# Patient Record
Sex: FEMALE | Race: WHITE | Employment: STUDENT | URBAN - METROPOLITAN AREA
[De-identification: names, ages, dates, MRNs, and addresses within clinical notes are randomized per-mention and may not be internally consistent; named-entity substitution may affect disease eponyms.]

---
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
## 2021-09-30 ENCOUNTER — TELEMEDICINE (OUTPATIENT)
Dept: PRIMARY CARE CLINIC | Age: 21
End: 2021-09-30

## 2021-09-30 DIAGNOSIS — J01.40 ACUTE NON-RECURRENT PANSINUSITIS: Primary | ICD-10-CM

## 2021-09-30 PROBLEM — Z87.81 HISTORY OF PELVIC FRACTURE: Status: ACTIVE | Noted: 2017-10-20

## 2021-09-30 PROBLEM — J30.89 PERENNIAL ALLERGIC RHINITIS WITH SEASONAL VARIATION: Status: RESOLVED | Noted: 2021-09-30 | Resolved: 2021-09-30

## 2021-09-30 PROBLEM — J30.2 PERENNIAL ALLERGIC RHINITIS WITH SEASONAL VARIATION: Status: RESOLVED | Noted: 2021-09-30 | Resolved: 2021-09-30

## 2021-09-30 PROBLEM — J30.2 PERENNIAL ALLERGIC RHINITIS WITH SEASONAL VARIATION: Status: ACTIVE | Noted: 2021-09-30

## 2021-09-30 PROBLEM — B35.1 ONYCHOMYCOSIS: Status: RESOLVED | Noted: 2018-10-10 | Resolved: 2021-09-30

## 2021-09-30 PROBLEM — J30.89 PERENNIAL ALLERGIC RHINITIS WITH SEASONAL VARIATION: Status: ACTIVE | Noted: 2021-09-30

## 2021-09-30 PROBLEM — R55 VASOVAGAL RESPONSE: Status: RESOLVED | Noted: 2017-10-20 | Resolved: 2021-09-30

## 2021-09-30 PROBLEM — R55 VASOVAGAL RESPONSE: Status: ACTIVE | Noted: 2017-10-20

## 2021-09-30 PROBLEM — Z87.81 HISTORY OF PELVIC FRACTURE: Status: RESOLVED | Noted: 2017-10-20 | Resolved: 2021-09-30

## 2021-09-30 PROBLEM — F43.20 ADJUSTMENT REACTION: Status: RESOLVED | Noted: 2020-03-04 | Resolved: 2021-09-30

## 2021-09-30 PROBLEM — F43.20 ADJUSTMENT REACTION: Status: ACTIVE | Noted: 2020-03-04

## 2021-09-30 PROCEDURE — 99203 OFFICE O/P NEW LOW 30 MIN: CPT

## 2021-09-30 RX ORDER — AMOXICILLIN AND CLAVULANATE POTASSIUM 875; 125 MG/1; MG/1
1 TABLET, FILM COATED ORAL 2 TIMES DAILY
COMMUNITY
Start: 2021-03-18

## 2021-09-30 RX ORDER — AMOXICILLIN AND CLAVULANATE POTASSIUM 500; 125 MG/1; MG/1
1 TABLET, FILM COATED ORAL 3 TIMES DAILY
Qty: 21 TABLET | Refills: 0 | Status: SHIPPED | OUTPATIENT
Start: 2021-09-30 | End: 2021-10-07

## 2021-09-30 RX ORDER — LEVONORGESTREL 19.5 MG/1
19.5 INTRAUTERINE DEVICE INTRAUTERINE ONCE
COMMUNITY

## 2021-09-30 RX ORDER — METRONIDAZOLE 500 MG/1
500 TABLET ORAL 2 TIMES DAILY
COMMUNITY
Start: 2021-09-10

## 2021-09-30 ASSESSMENT — ENCOUNTER SYMPTOMS
WHEEZING: 0
SINUS PRESSURE: 1
TROUBLE SWALLOWING: 0
SORE THROAT: 1
VOICE CHANGE: 1
EYE ITCHING: 0
EYE PAIN: 0
COUGH: 1
EYE REDNESS: 0
VOMITING: 0
RHINORRHEA: 0
FACIAL SWELLING: 0
CHEST TIGHTNESS: 0
NAUSEA: 0
CONSTIPATION: 0
SINUS PAIN: 1
DIARRHEA: 0
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
## 2021-09-30 NOTE — PROGRESS NOTES
PROGRESS NOTE     Lázaro Allen MD  [address] [place] [address], [address], [address] [place] [address]         Phone: [phone]      Name:  Cuca Prajapati  :  2000  Date:  2021    ASSESSMENT/PLAN:    There are no diagnoses linked to this encounter. SUBJECTIVE/OBJECTIVE:    CC:  ***      HPI:     ***    ROS:  ***      No flowsheet data found. Outpatient Medications Marked as Taking for the 21 encounter (Telemedicine) with Kannan Garcia,    Medication Sig Dispense Refill    amoxicillin-clavulanate (AUGMENTIN) 875-125 MG per tablet Take 1 tablet by mouth 2 times daily         Physical Exam:    Constitutional:   · Reviewed vitals above  · Well Nourished, well developed, no distress       HENT:  · No trouble breathing through nose  Resp:  · Normal effort  · No visualized signs of difficulty breathing or respiratory distress  Musculoskeletal:  · Normal Gait  · Normal ROM of neck w/o pain  Skin:  · No rashes on inspection of facial skin  Psych:  · Normal mood and affect  · Normal insight and judgement          {Time Documentation Optional:[id number]}        Cuca Prajapati  is a 24 y.o.  female being evaluated by a Virtual Visit (video visit) encounter to address concerns as mentioned above. A caregiver was present when appropriate. Due to this being a TeleHealth encounter (During [facility]- public health emergency), evaluation of the following organ systems was limited: Vitals/Constitutional/EENT/Resp/CV/GI//MS/Neuro/Skin/Heme-Lymph-Imm.   Pursuant to the emergency declaration under the [address] [place] [address], [address] [place] [address] authority and the [facility] and [employer] Act, this Virtual Visit was conducted with patient's (and/or legal guardian's) consent, to reduce the patient's risk of exposure to COVID-19 and provide necessary medical care. The patient (and/or legal guardian) has also been advised to contact this office for worsening conditions or problems, and seek emergency medical treatment and/or call 911 if deemed necessary. Patient identification was verified at the start of the visit: {YES/NO:19726}    Services were provided through a video synchronous discussion virtually to substitute for in-person clinic visit. Patient was located at home and provider was located in office or at home. EMR Dragon/transcription disclaimer:  Much of this encounter note is electronic transcription/translation of spoken language to printed texts. The electronic translation of spoken language may be erroneous, or at times, nonsensical words or phrases may be inadvertently transcribed. Although I have reviewed the note for such errors, some may still exist.       An electronic signature was used to authenticate this note.     --Dahiana Messina MD